# Patient Record
Sex: MALE | Race: WHITE | NOT HISPANIC OR LATINO | URBAN - METROPOLITAN AREA
[De-identification: names, ages, dates, MRNs, and addresses within clinical notes are randomized per-mention and may not be internally consistent; named-entity substitution may affect disease eponyms.]

---

## 2018-01-05 ENCOUNTER — EMERGENCY (EMERGENCY)
Facility: HOSPITAL | Age: 58
LOS: 1 days | Discharge: HOME | End: 2018-01-05
Attending: EMERGENCY MEDICINE | Admitting: EMERGENCY MEDICINE
Payer: COMMERCIAL

## 2018-01-05 VITALS
HEART RATE: 80 BPM | TEMPERATURE: 98 F | SYSTOLIC BLOOD PRESSURE: 110 MMHG | OXYGEN SATURATION: 98 % | HEIGHT: 68 IN | RESPIRATION RATE: 16 BRPM | DIASTOLIC BLOOD PRESSURE: 74 MMHG | WEIGHT: 177.91 LBS

## 2018-01-05 DIAGNOSIS — Z98.89 OTHER SPECIFIED POSTPROCEDURAL STATES: Chronic | ICD-10-CM

## 2018-01-05 DIAGNOSIS — Z95.1 PRESENCE OF AORTOCORONARY BYPASS GRAFT: Chronic | ICD-10-CM

## 2018-01-05 PROCEDURE — 99284 EMERGENCY DEPT VISIT MOD MDM: CPT

## 2018-01-05 PROCEDURE — 99285 EMERGENCY DEPT VISIT HI MDM: CPT

## 2018-01-05 NOTE — ED PROVIDER NOTE - OBJECTIVE STATEMENT
pt bib ems for syncope. pt was asleep at kitchen table when his son found him. pt reports felt fine this morning, went out shopping, when came home took one of his wife's xanax to "get high". pt denies fevers, chills, ha, d/n/v, cp, palp, abd pain, weakness, numbness, speech or visual changes, depression, anxiety, suicidal or homicidal ideation.  pmd/cards - garett

## 2018-01-05 NOTE — ED PROVIDER NOTE - PROGRESS NOTE DETAILS
Pt refusing EKG. labs, ct or any other testing or treatment in ER. Had an at length discussion with patient.  Patient wishes to leave at this time.  The patient understands that they are leaving against medical advice despite the risk of missing a potentially serious diagnosis which may lead to injury, disability and/or death.  I discussed with the patient which tests would need to be performed and what type of monitoring would be necessary for the patient as well.  I was unable to convince patient to stay for further workup.  The patient was given an opportunity to ask any questions as well.   The patient demonstrates understanding of all risks, is awake and alert and demonstrates competance to make medical decisions.  The patient understands that they may return at any time if desired. Discussed the importance of close, prompt medical follow-up.  Also present at bedside for discussions: wife

## 2018-01-05 NOTE — ED ADULT NURSE NOTE - OBJECTIVE STATEMENT
received pt in bed #4b Pt A&O Pt's son found pt passed out at table. Pt admits to taking xanax. Dr Burgos Pt refusing any treatment Dr Burgos exlained to pt risks of signing out patient education & verbalized undersatnding that signing AMA & leaving can result in death AMA signed & on chart Pt still requesting to leave

## 2018-01-05 NOTE — ED PROVIDER NOTE - PSH
H/O heart surgery  triple bypass  H/O heart surgery  cardiac stents  S/P CABG x 3  3v CABG 2010 at [facility]

## 2018-01-05 NOTE — ED ADULT NURSE NOTE - PMH
CAD (coronary artery disease) of artery bypass graft    Claudication of both lower extremities    CVA (cerebral vascular accident)  secondary to heart surgery  Essential hypertension    H/O heart surgery    High cholesterol    Hyperlipidemia    Hypertension

## 2018-01-05 NOTE — ED PROVIDER NOTE - CHPI ED SYMPTOMS NEG
no fever/no nausea/no chills/no back pain/no chest pain/no cough/no dizziness/no shortness of breath/no vomiting

## 2018-01-05 NOTE — ED PROVIDER NOTE - CARE PLAN
Principal Discharge DX:	Syncope Principal Discharge DX:	Syncope  Secondary Diagnosis:	Substance abuse

## 2018-01-05 NOTE — ED ADULT NURSE NOTE - PSH
H/O heart surgery  triple bypass  H/O heart surgery  cardiac stents  S/P CABG x 3  3v CABG 2010 at [facility]

## 2018-03-05 ENCOUNTER — APPOINTMENT (OUTPATIENT)
Dept: OTOLARYNGOLOGY | Facility: CLINIC | Age: 58
End: 2018-03-05
Payer: COMMERCIAL

## 2018-03-05 VITALS
BODY MASS INDEX: 29.37 KG/M2 | DIASTOLIC BLOOD PRESSURE: 100 MMHG | SYSTOLIC BLOOD PRESSURE: 137 MMHG | HEART RATE: 80 BPM | HEIGHT: 64 IN | WEIGHT: 172 LBS

## 2018-03-05 PROCEDURE — 31575 DIAGNOSTIC LARYNGOSCOPY: CPT

## 2018-03-05 PROCEDURE — 99205 OFFICE O/P NEW HI 60 MIN: CPT | Mod: 25

## 2018-03-23 ENCOUNTER — OUTPATIENT (OUTPATIENT)
Dept: OUTPATIENT SERVICES | Facility: HOSPITAL | Age: 58
LOS: 1 days | End: 2018-03-23
Payer: COMMERCIAL

## 2018-03-23 VITALS
WEIGHT: 186.07 LBS | HEIGHT: 67.5 IN | HEART RATE: 66 BPM | TEMPERATURE: 97 F | SYSTOLIC BLOOD PRESSURE: 140 MMHG | RESPIRATION RATE: 16 BRPM | DIASTOLIC BLOOD PRESSURE: 86 MMHG

## 2018-03-23 DIAGNOSIS — J38.3 OTHER DISEASES OF VOCAL CORDS: ICD-10-CM

## 2018-03-23 DIAGNOSIS — Z95.1 PRESENCE OF AORTOCORONARY BYPASS GRAFT: Chronic | ICD-10-CM

## 2018-03-23 DIAGNOSIS — I73.9 PERIPHERAL VASCULAR DISEASE, UNSPECIFIED: ICD-10-CM

## 2018-03-23 DIAGNOSIS — Z98.89 OTHER SPECIFIED POSTPROCEDURAL STATES: Chronic | ICD-10-CM

## 2018-03-23 DIAGNOSIS — R94.31 ABNORMAL ELECTROCARDIOGRAM [ECG] [EKG]: ICD-10-CM

## 2018-03-23 DIAGNOSIS — I10 ESSENTIAL (PRIMARY) HYPERTENSION: ICD-10-CM

## 2018-03-23 DIAGNOSIS — I25.810 ATHEROSCLEROSIS OF CORONARY ARTERY BYPASS GRAFT(S) WITHOUT ANGINA PECTORIS: ICD-10-CM

## 2018-03-23 DIAGNOSIS — Z98.62 PERIPHERAL VASCULAR ANGIOPLASTY STATUS: Chronic | ICD-10-CM

## 2018-03-23 LAB
BUN SERPL-MCNC: 24 MG/DL — HIGH (ref 7–23)
CALCIUM SERPL-MCNC: 8.9 MG/DL — SIGNIFICANT CHANGE UP (ref 8.4–10.5)
CHLORIDE SERPL-SCNC: 105 MMOL/L — SIGNIFICANT CHANGE UP (ref 98–107)
CO2 SERPL-SCNC: 24 MMOL/L — SIGNIFICANT CHANGE UP (ref 22–31)
CREAT SERPL-MCNC: 0.8 MG/DL — SIGNIFICANT CHANGE UP (ref 0.5–1.3)
GLUCOSE SERPL-MCNC: 83 MG/DL — SIGNIFICANT CHANGE UP (ref 70–99)
HCT VFR BLD CALC: 44.5 % — SIGNIFICANT CHANGE UP (ref 39–50)
HGB BLD-MCNC: 15.2 G/DL — SIGNIFICANT CHANGE UP (ref 13–17)
MCHC RBC-ENTMCNC: 29.9 PG — SIGNIFICANT CHANGE UP (ref 27–34)
MCHC RBC-ENTMCNC: 34.2 % — SIGNIFICANT CHANGE UP (ref 32–36)
MCV RBC AUTO: 87.4 FL — SIGNIFICANT CHANGE UP (ref 80–100)
NRBC # FLD: 0 — SIGNIFICANT CHANGE UP
PLATELET # BLD AUTO: 313 K/UL — SIGNIFICANT CHANGE UP (ref 150–400)
PMV BLD: 10.2 FL — SIGNIFICANT CHANGE UP (ref 7–13)
POTASSIUM SERPL-MCNC: 4.2 MMOL/L — SIGNIFICANT CHANGE UP (ref 3.5–5.3)
POTASSIUM SERPL-SCNC: 4.2 MMOL/L — SIGNIFICANT CHANGE UP (ref 3.5–5.3)
RBC # BLD: 5.09 M/UL — SIGNIFICANT CHANGE UP (ref 4.2–5.8)
RBC # FLD: 13 % — SIGNIFICANT CHANGE UP (ref 10.3–14.5)
SODIUM SERPL-SCNC: 144 MMOL/L — SIGNIFICANT CHANGE UP (ref 135–145)
WBC # BLD: 8.6 K/UL — SIGNIFICANT CHANGE UP (ref 3.8–10.5)
WBC # FLD AUTO: 8.6 K/UL — SIGNIFICANT CHANGE UP (ref 3.8–10.5)

## 2018-03-23 PROCEDURE — 93010 ELECTROCARDIOGRAM REPORT: CPT

## 2018-03-23 NOTE — H&P PST ADULT - PROBLEM SELECTOR PLAN 4
Pt was instructed by his cardiologist to remain on Ecotrin 81mg due to the presence of peripheral stents

## 2018-03-23 NOTE — H&P PST ADULT - PROBLEM SELECTOR PLAN 3
Pt was instructed by his Cardiologist to hold Plavix for 7 days preop. Last dose was 3/21/18  He was seen by his Cardiologist for preop evaluation   Cardiac evaluation requested with recent ECHO and Stress test reports

## 2018-03-23 NOTE — H&P PST ADULT - PROBLEM SELECTOR PLAN 1
Scheduled for Suspension Microlaryngoscopy with Biopsy Esophagoscopy on 3/28/2018.   Preop instructions given, pt verbalized understanding   GI prophylaxis provided

## 2018-03-23 NOTE — H&P PST ADULT - HISTORY OF PRESENT ILLNESS
57 y/o male with PMH of CAD s/p CABG currently on Plavix, HTN, HLD and PAD s/p peripheral artery stenting presents to PST for preoperative evaluation with dx of disease of vocal cords. Pt presented to his PCP c/o voice hoarseness in Dec2017.  Referred to ENT and s/p CT scan of neck and chest which noted small mass of right vocal cord. Pt s/p laryngoscopy with Dr. Sanabria 3 weeks ago which noted right vocal cord lesion requiring  further evaluation. Scheduled for Suspension Microlaryngoscopy with Biopsy Esophagoscopy on 3/28/2018. 59 y/o male with PMH of CAD s/p CABG x3 on Plavix, HTN, HLD and PAD s/p peripheral artery stenting presents to PST for preoperative evaluation with dx of disease of vocal cords. Pt presented to his PCP c/o voice hoarseness in Dec2017.  Referred to ENT and s/p CT scan of neck and chest which noted small mass of right vocal cord.  Pt s/p laryngoscopy with Dr. Sanabria 3 weeks ago which noted right vocal cord lesion requiring further evaluation. Scheduled for Suspension Microlaryngoscopy with Biopsy Esophagoscopy on 3/28/2018.

## 2018-03-23 NOTE — H&P PST ADULT - NEUROLOGICAL COMMENTS
Pt reports after CABG in 2010 he had a CVA during surgery. Pt reports he lost peripheral vision which had not fully returned. Seen by Neuro. Last visit 2011 Pt reports after CABG in 2010 he had a CVA during surgery. Pt reports he lost peripheral vision of his left eye which had not fully returned. Seen by Neuro. Last visit 2011

## 2018-03-23 NOTE — H&P PST ADULT - NEGATIVE NEUROLOGICAL SYMPTOMS
no generalized seizures/no transient paralysis/no weakness/no hemiparesis/no facial palsy/no paresthesias/no focal seizures/no difficulty walking/no loss of consciousness/no loss of sensation/no headache/no syncope/no tremors/no vertigo

## 2018-03-23 NOTE — H&P PST ADULT - NEGATIVE CARDIOVASCULAR SYMPTOMS
no chest pain/no dyspnea on exertion/no palpitations/no paroxysmal nocturnal dyspnea/no peripheral edema

## 2018-03-23 NOTE — H&P PST ADULT - NEGATIVE ENMT SYMPTOMS
no sinus symptoms/no nasal congestion/no tinnitus/no nose bleeds/no ear pain/no gum bleeding/no throat pain/no dysphagia/no hearing difficulty

## 2018-03-23 NOTE — H&P PST ADULT - PSH
S/P CABG x 3  3v CABG 2010 at [facility]  S/P peripheral artery angioplasty  11/2015 5 stents to right leg

## 2018-03-23 NOTE — H&P PST ADULT - PMH
CAD (coronary artery disease) of artery bypass graft    Claudication of both lower extremities    CVA (cerebral vascular accident)  secondary to heart surgery  Essential hypertension    H/O heart surgery    High cholesterol    Hyperlipidemia    Hypertension    PAD (peripheral artery disease) CAD (coronary artery disease) of artery bypass graft    Claudication of both lower extremities    CVA (cerebral vascular accident)  secondary to heart surgery  Essential hypertension    H/O heart surgery    High cholesterol    Hyperlipidemia    Hypertension    PAD (peripheral artery disease)    Vocal cord disease

## 2018-03-23 NOTE — H&P PST ADULT - NSANTHOSAYNRD_GEN_A_CORE
No. CHRISTOPHER screening performed.  STOP BANG Legend: 0-2 = LOW Risk; 3-4 = INTERMEDIATE Risk; 5-8 = HIGH Risk

## 2018-03-27 ENCOUNTER — TRANSCRIPTION ENCOUNTER (OUTPATIENT)
Age: 58
End: 2018-03-27

## 2018-03-27 NOTE — ASU PATIENT PROFILE, ADULT - PMH
CAD (coronary artery disease) of artery bypass graft    Claudication of both lower extremities    CVA (cerebral vascular accident)  secondary to heart surgery  Essential hypertension    H/O heart surgery    High cholesterol    Hyperlipidemia    Hypertension    PAD (peripheral artery disease)    Vocal cord disease

## 2018-03-27 NOTE — ASU PATIENT PROFILE, ADULT - PSH
S/P CABG x 3  3v CABG 2010 at [facility]  S/P peripheral artery angioplasty  11/2015 5 stents to right leg

## 2018-03-28 ENCOUNTER — RESULT REVIEW (OUTPATIENT)
Age: 58
End: 2018-03-28

## 2018-03-28 ENCOUNTER — APPOINTMENT (OUTPATIENT)
Dept: OTOLARYNGOLOGY | Facility: HOSPITAL | Age: 58
End: 2018-03-28

## 2018-03-28 ENCOUNTER — OUTPATIENT (OUTPATIENT)
Dept: OUTPATIENT SERVICES | Facility: HOSPITAL | Age: 58
LOS: 1 days | Discharge: HOME | End: 2018-03-28
Payer: COMMERCIAL

## 2018-03-28 VITALS
OXYGEN SATURATION: 95 % | RESPIRATION RATE: 14 BRPM | DIASTOLIC BLOOD PRESSURE: 62 MMHG | HEART RATE: 62 BPM | SYSTOLIC BLOOD PRESSURE: 124 MMHG

## 2018-03-28 VITALS
WEIGHT: 186.07 LBS | DIASTOLIC BLOOD PRESSURE: 83 MMHG | HEIGHT: 67.5 IN | RESPIRATION RATE: 16 BRPM | TEMPERATURE: 98 F | HEART RATE: 68 BPM | SYSTOLIC BLOOD PRESSURE: 121 MMHG | OXYGEN SATURATION: 97 %

## 2018-03-28 DIAGNOSIS — Z98.62 PERIPHERAL VASCULAR ANGIOPLASTY STATUS: Chronic | ICD-10-CM

## 2018-03-28 DIAGNOSIS — J38.3 OTHER DISEASES OF VOCAL CORDS: ICD-10-CM

## 2018-03-28 DIAGNOSIS — Z95.1 PRESENCE OF AORTOCORONARY BYPASS GRAFT: Chronic | ICD-10-CM

## 2018-03-28 PROCEDURE — 88305 TISSUE EXAM BY PATHOLOGIST: CPT | Mod: 26

## 2018-03-28 PROCEDURE — 88331 PATH CONSLTJ SURG 1 BLK 1SPC: CPT | Mod: 26

## 2018-03-28 PROCEDURE — 31541 LARYNSCOP W/TUMR EXC + SCOPE: CPT

## 2018-03-28 PROCEDURE — 43191 ESOPHAGOSCOPY RIGID TRNSO DX: CPT

## 2018-03-28 RX ORDER — SODIUM CHLORIDE 9 MG/ML
1000 INJECTION, SOLUTION INTRAVENOUS
Qty: 0 | Refills: 0 | Status: DISCONTINUED | OUTPATIENT
Start: 2018-03-28 | End: 2018-04-12

## 2018-03-28 RX ORDER — PANTOPRAZOLE SODIUM 20 MG/1
1 TABLET, DELAYED RELEASE ORAL
Qty: 30 | Refills: 2 | OUTPATIENT
Start: 2018-03-28

## 2018-03-28 NOTE — ASU DISCHARGE PLAN (ADULT/PEDIATRIC). - INSTRUCTIONS
Advance as tolerated Advance as tolerated. Cool and warm liquids that are not irritating to the throat should be given for the first day or two. Avoid hot liquids. Avoid citrus juices and milk. Advance at your own pace starting with soft foods and advancing to a regular diet. Avoid rough and scratchy foods. .

## 2018-03-28 NOTE — ASU DISCHARGE PLAN (ADULT/PEDIATRIC). - ITEMS TO FOLLOWUP WITH YOUR PHYSICIAN'S
1. Use tylenol or motrin as needed for pain control.  2. Take pantoprazole as prescribed.  3. Voice rest (avoid voice use, no whispering or shouting) for 5 days

## 2018-03-28 NOTE — ASU DISCHARGE PLAN (ADULT/PEDIATRIC). - MEDICATION SUMMARY - MEDICATIONS TO TAKE
I will START or STAY ON the medications listed below when I get home from the hospital:    Ecotrin Adult Low Strength 81 mg oral delayed release tablet  -- 1 tab(s) by mouth once a day AM  -- Indication: For home medication    ramipril 5 mg oral tablet  -- 1 tab(s) by mouth once a day AM  -- Indication: For home medication    atorvastatin 80 mg oral tablet  -- 1 tab(s) by mouth once a day PM  -- Indication: For home medication    Plavix 75 mg oral tablet  -- 1 tab(s) by mouth once a day LD 3/21/18  -- Indication: For home medication    amLODIPine 5 mg oral tablet  -- 1 tab(s) by mouth once a day AM  -- Indication: For home medication    pantoprazole 40 mg oral delayed release tablet  -- 1 tab(s) by mouth once a day   -- It is very important that you take or use this exactly as directed.  Do not skip doses or discontinue unless directed by your doctor.  Obtain medical advice before taking any non-prescription drugs as some may affect the action of this medication.  Swallow whole.  Do not crush.    -- Indication: For acid inhibitor

## 2018-03-28 NOTE — ASU DISCHARGE PLAN (ADULT/PEDIATRIC). - NOTIFY
Swelling that continues/Bleeding that does not stop Fever greater than 101/Persistent Nausea and Vomiting/Bleeding that does not stop/Swelling that continues

## 2018-03-28 NOTE — ASU DISCHARGE PLAN (ADULT/PEDIATRIC). - NURSING INSTRUCTIONS
Do Not take any more than 3000mg in a 24hr period of Tylenol. Please do not take any until until after 7:40pm

## 2018-04-06 ENCOUNTER — APPOINTMENT (OUTPATIENT)
Dept: OTOLARYNGOLOGY | Facility: CLINIC | Age: 58
End: 2018-04-06
Payer: COMMERCIAL

## 2018-04-06 VITALS
BODY MASS INDEX: 27.74 KG/M2 | WEIGHT: 183 LBS | HEART RATE: 85 BPM | OXYGEN SATURATION: 95 % | DIASTOLIC BLOOD PRESSURE: 70 MMHG | SYSTOLIC BLOOD PRESSURE: 120 MMHG | HEIGHT: 68 IN

## 2018-04-06 DIAGNOSIS — J38.3 OTHER DISEASES OF VOCAL CORDS: ICD-10-CM

## 2018-04-06 PROCEDURE — 99214 OFFICE O/P EST MOD 30 MIN: CPT | Mod: 25

## 2018-04-06 PROCEDURE — 31575 DIAGNOSTIC LARYNGOSCOPY: CPT

## 2018-04-06 RX ORDER — OXYCODONE AND ACETAMINOPHEN 5; 325 MG/1; MG/1
5-325 TABLET ORAL
Qty: 28 | Refills: 0 | Status: DISCONTINUED | COMMUNITY
Start: 2018-03-29 | End: 2018-04-06

## 2018-04-06 RX ORDER — ATORVASTATIN CALCIUM 80 MG/1
80 TABLET, FILM COATED ORAL
Qty: 30 | Refills: 0 | Status: ACTIVE | COMMUNITY
Start: 2017-08-21

## 2018-04-30 ENCOUNTER — OUTPATIENT (OUTPATIENT)
Dept: OUTPATIENT SERVICES | Facility: HOSPITAL | Age: 58
LOS: 1 days | End: 2018-04-30
Payer: COMMERCIAL

## 2018-04-30 VITALS
TEMPERATURE: 97 F | HEIGHT: 67 IN | DIASTOLIC BLOOD PRESSURE: 88 MMHG | WEIGHT: 184.09 LBS | HEART RATE: 76 BPM | RESPIRATION RATE: 16 BRPM | SYSTOLIC BLOOD PRESSURE: 140 MMHG

## 2018-04-30 DIAGNOSIS — Z98.62 PERIPHERAL VASCULAR ANGIOPLASTY STATUS: Chronic | ICD-10-CM

## 2018-04-30 DIAGNOSIS — D02.0 CARCINOMA IN SITU OF LARYNX: ICD-10-CM

## 2018-04-30 DIAGNOSIS — Z01.818 ENCOUNTER FOR OTHER PREPROCEDURAL EXAMINATION: ICD-10-CM

## 2018-04-30 DIAGNOSIS — Z95.1 PRESENCE OF AORTOCORONARY BYPASS GRAFT: Chronic | ICD-10-CM

## 2018-04-30 LAB
BUN SERPL-MCNC: 17 MG/DL — SIGNIFICANT CHANGE UP (ref 7–23)
CALCIUM SERPL-MCNC: 9 MG/DL — SIGNIFICANT CHANGE UP (ref 8.4–10.5)
CHLORIDE SERPL-SCNC: 100 MMOL/L — SIGNIFICANT CHANGE UP (ref 98–107)
CO2 SERPL-SCNC: 25 MMOL/L — SIGNIFICANT CHANGE UP (ref 22–31)
CREAT SERPL-MCNC: 0.71 MG/DL — SIGNIFICANT CHANGE UP (ref 0.5–1.3)
GLUCOSE SERPL-MCNC: 85 MG/DL — SIGNIFICANT CHANGE UP (ref 70–99)
HCT VFR BLD CALC: 45.3 % — SIGNIFICANT CHANGE UP (ref 39–50)
HGB BLD-MCNC: 15.1 G/DL — SIGNIFICANT CHANGE UP (ref 13–17)
MCHC RBC-ENTMCNC: 29.2 PG — SIGNIFICANT CHANGE UP (ref 27–34)
MCHC RBC-ENTMCNC: 33.3 % — SIGNIFICANT CHANGE UP (ref 32–36)
MCV RBC AUTO: 87.6 FL — SIGNIFICANT CHANGE UP (ref 80–100)
NRBC # FLD: 0 — SIGNIFICANT CHANGE UP
PLATELET # BLD AUTO: 322 K/UL — SIGNIFICANT CHANGE UP (ref 150–400)
PMV BLD: 10.7 FL — SIGNIFICANT CHANGE UP (ref 7–13)
POTASSIUM SERPL-MCNC: 4.2 MMOL/L — SIGNIFICANT CHANGE UP (ref 3.5–5.3)
POTASSIUM SERPL-SCNC: 4.2 MMOL/L — SIGNIFICANT CHANGE UP (ref 3.5–5.3)
RBC # BLD: 5.17 M/UL — SIGNIFICANT CHANGE UP (ref 4.2–5.8)
RBC # FLD: 13.2 % — SIGNIFICANT CHANGE UP (ref 10.3–14.5)
SODIUM SERPL-SCNC: 139 MMOL/L — SIGNIFICANT CHANGE UP (ref 135–145)
WBC # BLD: 9.74 K/UL — SIGNIFICANT CHANGE UP (ref 3.8–10.5)
WBC # FLD AUTO: 9.74 K/UL — SIGNIFICANT CHANGE UP (ref 3.8–10.5)

## 2018-04-30 PROCEDURE — 93010 ELECTROCARDIOGRAM REPORT: CPT

## 2018-04-30 RX ORDER — CLOPIDOGREL BISULFATE 75 MG/1
1 TABLET, FILM COATED ORAL
Qty: 0 | Refills: 0 | COMMUNITY

## 2018-04-30 RX ORDER — SODIUM CHLORIDE 9 MG/ML
1000 INJECTION, SOLUTION INTRAVENOUS
Qty: 0 | Refills: 0 | Status: DISCONTINUED | OUTPATIENT
Start: 2018-05-02 | End: 2018-05-17

## 2018-04-30 NOTE — H&P PST ADULT - PMH
CAD (coronary artery disease) of artery bypass graft    Claudication of both lower extremities    CVA (cerebral vascular accident)  secondary to heart surgery  Essential hypertension    H/O heart surgery    High cholesterol    Hyperlipidemia    Hypertension    PAD (peripheral artery disease)    Vocal cord disease CAD (coronary artery disease) of artery bypass graft    Carcinoma in situ  of larynx  Claudication of both lower extremities    CVA (cerebral vascular accident)  secondary to heart surgery  Essential hypertension    H/O heart surgery    High cholesterol    Hyperlipidemia    Hypertension    PAD (peripheral artery disease)    Vocal cord disease

## 2018-04-30 NOTE — H&P PST ADULT - PROBLEM SELECTOR PLAN 3
Pt was instructed by his Cardiologist to hold Plavix for 7 days preop. Last dose was 3/21/18  He was seen by his Cardiologist for preop evaluation   Cardiac evaluation requested with recent ECHO and Stress test reports Pt was instructed by his Cardiologist to hold plavix, last dose 4/25/2018  Cardiac eval, recent ECHO and Stress test reports pending Pt was instructed by his cardiologist to remain on Ecotrin 81mg due to the presence of peripheral stents

## 2018-04-30 NOTE — H&P PST ADULT - NEGATIVE NEUROLOGICAL SYMPTOMS
no difficulty walking/no weakness/no generalized seizures/no focal seizures/no syncope/no tremors/no headache/no facial palsy/no paresthesias/no transient paralysis/no vertigo/no loss of sensation/no loss of consciousness/no hemiparesis

## 2018-04-30 NOTE — H&P PST ADULT - NEUROLOGICAL COMMENTS
Pt reports after CABG in 2010 he had a CVA during surgery. Pt reports he lost peripheral vision of his left eye which had not fully returned. Seen by Neuro. Last visit 2011

## 2018-04-30 NOTE — H&P PST ADULT - NEGATIVE CARDIOVASCULAR SYMPTOMS
no dyspnea on exertion/no paroxysmal nocturnal dyspnea/no palpitations/no chest pain/no peripheral edema

## 2018-04-30 NOTE — H&P PST ADULT - PSH
S/P CABG x 3  3v CABG 2010 at [facility]  S/P peripheral artery angioplasty  11/2015 5 stents to right leg

## 2018-04-30 NOTE — H&P PST ADULT - PROBLEM SELECTOR PLAN 6
Scheduled for suspension microlaryngoscope with excision of vocal cord lesion on 5/2/2018.    Preop instructions given, pt verbalized understanding   GI prophylaxis provided  Preop instructions given, pt verbalized understanding   GI prophylaxis provided

## 2018-04-30 NOTE — H&P PST ADULT - PROBLEM SELECTOR PLAN 4
Pt was instructed by his cardiologist to remain on Ecotrin 81mg due to the presence of peripheral stents Scheduled for suspension microlaryngoscope with excision of vocal cord lesion on 5/2/2018.    Preop instructions given, pt verbalized understanding   GI prophylaxis provided  CHRISTOPHER precaution recommended  OR booking notified via fax

## 2018-04-30 NOTE — H&P PST ADULT - PROBLEM SELECTOR PLAN 1
Scheduled for Suspension Microlaryngoscopy with Biopsy Esophagoscopy on 3/28/2018.   Preop instructions given, pt verbalized understanding   GI prophylaxis provided Pt to take Ramipril and Amlodipine AM of surgery with a sip of water

## 2018-04-30 NOTE — H&P PST ADULT - NEGATIVE ENMT SYMPTOMS
no ear pain/no hearing difficulty/no nose bleeds/no dysphagia/no throat pain/no tinnitus/no nasal congestion/no sinus symptoms/no gum bleeding

## 2018-04-30 NOTE — H&P PST ADULT - HISTORY OF PRESENT ILLNESS
57 y/o male with PMH of CAD s/p CABG x3 on Plavix, HTN, HLD and PAD s/p peripheral artery stenting presents to PST for preoperative evaluation with dx of disease of vocal cords. Pt presented to his PCP c/o voice hoarseness in Dec2017.  Referred to ENT and s/p CT scan of neck and chest which noted small mass of right vocal cord.  Pt s/p laryngoscopy with Dr. Sanabria 3 weeks ago which noted right vocal cord lesion requiring further evaluation. Scheduled for Suspension Microlaryngoscopy with Biopsy Esophagoscopy on 3/28/2018. 57 y/o male with PMH of CAD s/p CABG x3 on Plavix, HTN, HLD and PAD s/p peripheral artery stenting presents to PST for preoperative evaluation with dx of disease of vocal cords. Pt presented to his PCP c/o voice hoarseness in Dec2017.  Referred to ENT and s/p CT scan of neck and chest which noted small mass of right vocal cord.  Pt is s/p Suspension Microlaryngoscopy with Biopsy Esophagoscopy on 3/28/2018.  Per pt hoarseness much improved, now for follow up.  Scheduled for suspension microlaryngoscope with excision of vocal cord lesion on 5/2/2018.

## 2018-04-30 NOTE — H&P PST ADULT - PROBLEM SELECTOR PLAN 2
Pt to take Ramipril and Amlodipine AM of surgery with a sip of water Pt was instructed by his Cardiologist to hold plavix, last dose 4/25/2018  Cardiac eval, recent ECHO and Stress test reports pending

## 2018-05-01 ENCOUNTER — TRANSCRIPTION ENCOUNTER (OUTPATIENT)
Age: 58
End: 2018-05-01

## 2018-05-02 ENCOUNTER — RESULT REVIEW (OUTPATIENT)
Age: 58
End: 2018-05-02

## 2018-05-02 ENCOUNTER — APPOINTMENT (OUTPATIENT)
Dept: OTOLARYNGOLOGY | Facility: HOSPITAL | Age: 58
End: 2018-05-02

## 2018-05-02 ENCOUNTER — OUTPATIENT (OUTPATIENT)
Dept: OUTPATIENT SERVICES | Facility: HOSPITAL | Age: 58
LOS: 1 days | Discharge: HOME | End: 2018-05-02
Payer: COMMERCIAL

## 2018-05-02 VITALS
SYSTOLIC BLOOD PRESSURE: 126 MMHG | RESPIRATION RATE: 16 BRPM | TEMPERATURE: 98 F | DIASTOLIC BLOOD PRESSURE: 76 MMHG | HEIGHT: 67 IN | HEART RATE: 78 BPM | OXYGEN SATURATION: 97 % | WEIGHT: 184.09 LBS

## 2018-05-02 VITALS
DIASTOLIC BLOOD PRESSURE: 83 MMHG | RESPIRATION RATE: 15 BRPM | TEMPERATURE: 98 F | SYSTOLIC BLOOD PRESSURE: 127 MMHG | OXYGEN SATURATION: 98 %

## 2018-05-02 DIAGNOSIS — Z98.62 PERIPHERAL VASCULAR ANGIOPLASTY STATUS: Chronic | ICD-10-CM

## 2018-05-02 DIAGNOSIS — D02.0 CARCINOMA IN SITU OF LARYNX: ICD-10-CM

## 2018-05-02 DIAGNOSIS — Z95.1 PRESENCE OF AORTOCORONARY BYPASS GRAFT: Chronic | ICD-10-CM

## 2018-05-02 PROCEDURE — 31541 LARYNSCOP W/TUMR EXC + SCOPE: CPT | Mod: GC

## 2018-05-02 PROCEDURE — 88305 TISSUE EXAM BY PATHOLOGIST: CPT | Mod: 26

## 2018-05-02 PROCEDURE — 43191 ESOPHAGOSCOPY RIGID TRNSO DX: CPT | Mod: GC

## 2018-05-02 RX ORDER — OXYCODONE AND ACETAMINOPHEN 5; 325 MG/1; MG/1
1 TABLET ORAL EVERY 6 HOURS
Qty: 0 | Refills: 0 | Status: DISCONTINUED | OUTPATIENT
Start: 2018-05-02 | End: 2018-05-02

## 2018-05-02 RX ORDER — ATORVASTATIN CALCIUM 80 MG/1
1 TABLET, FILM COATED ORAL
Qty: 0 | Refills: 0 | COMMUNITY

## 2018-05-02 RX ORDER — CLOPIDOGREL BISULFATE 75 MG/1
1 TABLET, FILM COATED ORAL
Qty: 0 | Refills: 0 | COMMUNITY

## 2018-05-02 RX ORDER — ACETAMINOPHEN 500 MG
2 TABLET ORAL
Qty: 0 | Refills: 0 | COMMUNITY
Start: 2018-05-02

## 2018-05-02 RX ORDER — ASPIRIN/CALCIUM CARB/MAGNESIUM 324 MG
1 TABLET ORAL
Qty: 0 | Refills: 0 | COMMUNITY

## 2018-05-02 RX ORDER — ACETAMINOPHEN 500 MG
650 TABLET ORAL EVERY 6 HOURS
Qty: 0 | Refills: 0 | Status: DISCONTINUED | OUTPATIENT
Start: 2018-05-02 | End: 2018-05-17

## 2018-05-02 RX ORDER — SODIUM CHLORIDE 9 MG/ML
1000 INJECTION, SOLUTION INTRAVENOUS
Qty: 0 | Refills: 0 | Status: DISCONTINUED | OUTPATIENT
Start: 2018-05-02 | End: 2018-05-17

## 2018-05-02 RX ORDER — AMLODIPINE BESYLATE 2.5 MG/1
1 TABLET ORAL
Qty: 0 | Refills: 0 | COMMUNITY

## 2018-05-02 RX ORDER — RAMIPRIL 5 MG
1 CAPSULE ORAL
Qty: 0 | Refills: 0 | COMMUNITY

## 2018-05-02 RX ADMIN — SODIUM CHLORIDE 30 MILLILITER(S): 9 INJECTION, SOLUTION INTRAVENOUS at 13:19

## 2018-05-02 NOTE — ASU PATIENT PROFILE, ADULT - PMH
CAD (coronary artery disease) of artery bypass graft    Carcinoma in situ  of larynx  Claudication of both lower extremities    CVA (cerebral vascular accident)  secondary to heart surgery  Essential hypertension    H/O heart surgery    High cholesterol    Hyperlipidemia    Hypertension    PAD (peripheral artery disease)    Vocal cord disease

## 2018-05-02 NOTE — ASU PREOP CHECKLIST - 5.
report from Vijaya /MODESTO at 13: 05 report from Vijaya /MODESTO at 13: 05/ give report to  Luci at 16:24

## 2018-05-02 NOTE — ASU PATIENT PROFILE, ADULT - PSH
S/P CABG x 3  3v CABG 2010 at [facility]  S/P peripheral artery angioplasty  11/2015 5 stents to right leg

## 2018-05-02 NOTE — ASU DISCHARGE PLAN (ADULT/PEDIATRIC). - MEDICATION SUMMARY - MEDICATIONS TO TAKE
I will START or STAY ON the medications listed below when I get home from the hospital:    Ecotrin Adult Low Strength 81 mg oral delayed release tablet  -- 1 tab(s) by mouth once a day AM  -- Indication: For home med    acetaminophen 325 mg oral tablet  -- 2 tab(s) by mouth every 6 hours, As needed, Mild Pain (1 - 3)  -- Indication: For over the counter pain medication as needed    ramipril 5 mg oral tablet  -- 1 tab(s) by mouth once a day AM  -- Indication: For home med    atorvastatin 80 mg oral tablet  -- 1 tab(s) by mouth once a day PM  -- Indication: For home med    Plavix 75 mg oral tablet  -- 1 tab(s) by mouth once a day Last taken 4/25/18  -- Indication: For home med    amLODIPine 5 mg oral tablet  -- 1 tab(s) by mouth once a day AM  -- Indication: For home med

## 2018-05-02 NOTE — ASU DISCHARGE PLAN (ADULT/PEDIATRIC). - SPECIAL INSTRUCTIONS
voice rest x 3 days voice rest x 3 days  Attemp to speak in a normal speaking voice. No shouting or whispering

## 2018-05-02 NOTE — ASU DISCHARGE PLAN (ADULT/PEDIATRIC). - COMMENTS
call  PACU  [phone] ( open  24 hour  and weekend ) , [phone] ( open 6AM to 11 PM  closed weekend)

## 2018-05-02 NOTE — ASU PREOP CHECKLIST - 3.
po#[phone] pt. granted permission to leave message /and or speak with whoever answers the phone.

## 2018-05-22 ENCOUNTER — APPOINTMENT (OUTPATIENT)
Dept: OTOLARYNGOLOGY | Facility: CLINIC | Age: 58
End: 2018-05-22
Payer: COMMERCIAL

## 2018-05-22 VITALS — DIASTOLIC BLOOD PRESSURE: 72 MMHG | SYSTOLIC BLOOD PRESSURE: 107 MMHG | HEART RATE: 81 BPM

## 2018-05-22 PROCEDURE — 31575 DIAGNOSTIC LARYNGOSCOPY: CPT

## 2018-05-22 PROCEDURE — 99214 OFFICE O/P EST MOD 30 MIN: CPT | Mod: 25

## 2018-07-17 ENCOUNTER — EMERGENCY (EMERGENCY)
Facility: HOSPITAL | Age: 58
LOS: 1 days | Discharge: HOME | End: 2018-07-17
Attending: EMERGENCY MEDICINE
Payer: COMMERCIAL

## 2018-07-17 VITALS
RESPIRATION RATE: 19 BRPM | TEMPERATURE: 99 F | SYSTOLIC BLOOD PRESSURE: 119 MMHG | HEART RATE: 97 BPM | WEIGHT: 196.21 LBS | DIASTOLIC BLOOD PRESSURE: 71 MMHG | OXYGEN SATURATION: 96 % | HEIGHT: 68 IN

## 2018-07-17 VITALS
SYSTOLIC BLOOD PRESSURE: 139 MMHG | DIASTOLIC BLOOD PRESSURE: 91 MMHG | OXYGEN SATURATION: 95 % | HEART RATE: 83 BPM | TEMPERATURE: 98 F | RESPIRATION RATE: 15 BRPM

## 2018-07-17 DIAGNOSIS — Z98.62 PERIPHERAL VASCULAR ANGIOPLASTY STATUS: Chronic | ICD-10-CM

## 2018-07-17 DIAGNOSIS — Z95.1 PRESENCE OF AORTOCORONARY BYPASS GRAFT: Chronic | ICD-10-CM

## 2018-07-17 LAB
ALBUMIN SERPL ELPH-MCNC: 3.6 G/DL — SIGNIFICANT CHANGE UP (ref 3.3–5)
ALP SERPL-CCNC: 62 U/L — SIGNIFICANT CHANGE UP (ref 40–120)
ALT FLD-CCNC: 35 U/L — SIGNIFICANT CHANGE UP (ref 12–78)
ANION GAP SERPL CALC-SCNC: 10 MMOL/L — SIGNIFICANT CHANGE UP (ref 5–17)
AST SERPL-CCNC: 27 U/L — SIGNIFICANT CHANGE UP (ref 15–37)
BASOPHILS # BLD AUTO: 0.07 K/UL — SIGNIFICANT CHANGE UP (ref 0–0.2)
BASOPHILS NFR BLD AUTO: 0.7 % — SIGNIFICANT CHANGE UP (ref 0–2)
BILIRUB SERPL-MCNC: 0.8 MG/DL — SIGNIFICANT CHANGE UP (ref 0.2–1.2)
BUN SERPL-MCNC: 20 MG/DL — SIGNIFICANT CHANGE UP (ref 7–23)
CALCIUM SERPL-MCNC: 8.3 MG/DL — LOW (ref 8.5–10.1)
CHLORIDE SERPL-SCNC: 108 MMOL/L — SIGNIFICANT CHANGE UP (ref 96–108)
CO2 SERPL-SCNC: 24 MMOL/L — SIGNIFICANT CHANGE UP (ref 22–31)
CREAT SERPL-MCNC: 0.9 MG/DL — SIGNIFICANT CHANGE UP (ref 0.5–1.3)
EOSINOPHIL # BLD AUTO: 0.2 K/UL — SIGNIFICANT CHANGE UP (ref 0–0.5)
EOSINOPHIL NFR BLD AUTO: 2 % — SIGNIFICANT CHANGE UP (ref 0–6)
GLUCOSE SERPL-MCNC: 83 MG/DL — SIGNIFICANT CHANGE UP (ref 70–99)
HCT VFR BLD CALC: 39.7 % — SIGNIFICANT CHANGE UP (ref 39–50)
HGB BLD-MCNC: 14.3 G/DL — SIGNIFICANT CHANGE UP (ref 13–17)
IMM GRANULOCYTES NFR BLD AUTO: 0.5 % — SIGNIFICANT CHANGE UP (ref 0–1.5)
LACTATE SERPL-SCNC: 1.7 MMOL/L — SIGNIFICANT CHANGE UP (ref 0.7–2)
LYMPHOCYTES # BLD AUTO: 2.42 K/UL — SIGNIFICANT CHANGE UP (ref 1–3.3)
LYMPHOCYTES # BLD AUTO: 23.8 % — SIGNIFICANT CHANGE UP (ref 13–44)
MCHC RBC-ENTMCNC: 30.6 PG — SIGNIFICANT CHANGE UP (ref 27–34)
MCHC RBC-ENTMCNC: 36 GM/DL — SIGNIFICANT CHANGE UP (ref 32–36)
MCV RBC AUTO: 85 FL — SIGNIFICANT CHANGE UP (ref 80–100)
MONOCYTES # BLD AUTO: 0.83 K/UL — SIGNIFICANT CHANGE UP (ref 0–0.9)
MONOCYTES NFR BLD AUTO: 8.2 % — SIGNIFICANT CHANGE UP (ref 2–14)
NEUTROPHILS # BLD AUTO: 6.61 K/UL — SIGNIFICANT CHANGE UP (ref 1.8–7.4)
NEUTROPHILS NFR BLD AUTO: 64.8 % — SIGNIFICANT CHANGE UP (ref 43–77)
NRBC # BLD: 0 /100 WBCS — SIGNIFICANT CHANGE UP (ref 0–0)
PLATELET # BLD AUTO: 311 K/UL — SIGNIFICANT CHANGE UP (ref 150–400)
POTASSIUM SERPL-MCNC: 4.4 MMOL/L — SIGNIFICANT CHANGE UP (ref 3.5–5.3)
POTASSIUM SERPL-SCNC: 4.4 MMOL/L — SIGNIFICANT CHANGE UP (ref 3.5–5.3)
PROT SERPL-MCNC: 6.8 G/DL — SIGNIFICANT CHANGE UP (ref 6–8.3)
RBC # BLD: 4.67 M/UL — SIGNIFICANT CHANGE UP (ref 4.2–5.8)
RBC # FLD: 12.8 % — SIGNIFICANT CHANGE UP (ref 10.3–14.5)
SODIUM SERPL-SCNC: 142 MMOL/L — SIGNIFICANT CHANGE UP (ref 135–145)
WBC # BLD: 10.18 K/UL — SIGNIFICANT CHANGE UP (ref 3.8–10.5)
WBC # FLD AUTO: 10.18 K/UL — SIGNIFICANT CHANGE UP (ref 3.8–10.5)

## 2018-07-17 PROCEDURE — 36415 COLL VENOUS BLD VENIPUNCTURE: CPT

## 2018-07-17 PROCEDURE — 93971 EXTREMITY STUDY: CPT | Mod: 26,LT

## 2018-07-17 PROCEDURE — 71046 X-RAY EXAM CHEST 2 VIEWS: CPT

## 2018-07-17 PROCEDURE — 99284 EMERGENCY DEPT VISIT MOD MDM: CPT | Mod: 25

## 2018-07-17 PROCEDURE — 85027 COMPLETE CBC AUTOMATED: CPT

## 2018-07-17 PROCEDURE — 96365 THER/PROPH/DIAG IV INF INIT: CPT

## 2018-07-17 PROCEDURE — 87040 BLOOD CULTURE FOR BACTERIA: CPT

## 2018-07-17 PROCEDURE — 83605 ASSAY OF LACTIC ACID: CPT

## 2018-07-17 PROCEDURE — 80053 COMPREHEN METABOLIC PANEL: CPT

## 2018-07-17 PROCEDURE — 71046 X-RAY EXAM CHEST 2 VIEWS: CPT | Mod: 26

## 2018-07-17 PROCEDURE — 96375 TX/PRO/DX INJ NEW DRUG ADDON: CPT

## 2018-07-17 PROCEDURE — 99284 EMERGENCY DEPT VISIT MOD MDM: CPT

## 2018-07-17 PROCEDURE — 93971 EXTREMITY STUDY: CPT

## 2018-07-17 RX ORDER — CEFTRIAXONE 500 MG/1
1 INJECTION, POWDER, FOR SOLUTION INTRAMUSCULAR; INTRAVENOUS ONCE
Qty: 0 | Refills: 0 | Status: COMPLETED | OUTPATIENT
Start: 2018-07-17 | End: 2018-07-17

## 2018-07-17 RX ORDER — SODIUM CHLORIDE 9 MG/ML
1000 INJECTION INTRAMUSCULAR; INTRAVENOUS; SUBCUTANEOUS
Qty: 0 | Refills: 0 | Status: COMPLETED | OUTPATIENT
Start: 2018-07-17 | End: 2018-07-17

## 2018-07-17 RX ORDER — KETOROLAC TROMETHAMINE 30 MG/ML
30 SYRINGE (ML) INJECTION ONCE
Qty: 0 | Refills: 0 | Status: DISCONTINUED | OUTPATIENT
Start: 2018-07-17 | End: 2018-07-17

## 2018-07-17 RX ADMIN — Medication 30 MILLIGRAM(S): at 23:11

## 2018-07-17 RX ADMIN — Medication 30 MILLIGRAM(S): at 21:14

## 2018-07-17 RX ADMIN — SODIUM CHLORIDE 1000 MILLILITER(S): 9 INJECTION INTRAMUSCULAR; INTRAVENOUS; SUBCUTANEOUS at 23:11

## 2018-07-17 RX ADMIN — SODIUM CHLORIDE 2000 MILLILITER(S): 9 INJECTION INTRAMUSCULAR; INTRAVENOUS; SUBCUTANEOUS at 21:15

## 2018-07-17 RX ADMIN — CEFTRIAXONE 1 GRAM(S): 500 INJECTION, POWDER, FOR SOLUTION INTRAMUSCULAR; INTRAVENOUS at 23:11

## 2018-07-17 RX ADMIN — CEFTRIAXONE 100 GRAM(S): 500 INJECTION, POWDER, FOR SOLUTION INTRAMUSCULAR; INTRAVENOUS at 21:16

## 2018-07-17 RX ADMIN — SODIUM CHLORIDE 2000 MILLILITER(S): 9 INJECTION INTRAMUSCULAR; INTRAVENOUS; SUBCUTANEOUS at 23:17

## 2018-07-17 NOTE — ED PROVIDER NOTE - PROGRESS NOTE DETAILS
Reevaluated patient at bedside.  Patient feeling much improved.  Discussed the results of all diagnostic testing in ED and copies of all reports given.   An opportunity to ask questions was given.  Discussed the importance of prompt, close medical follow-up.  Patient will return with any changes, concerns or persistent / worsening symptoms.  Understanding of all instructions verbalized.   All results were explained to patient and family and a copy of all available results given.   Dw pt re ortho prec / inst, need for fu with ORtho asap and to return with any changes. pt refused cane.

## 2018-07-17 NOTE — ED PROVIDER NOTE - CARE PLAN
Principal Discharge DX:	Upper respiratory tract infection, unspecified type  Secondary Diagnosis:	Acute pain of left knee

## 2018-07-17 NOTE — ED PROVIDER NOTE - PSH
S/P CABG x 3  3v CABG 2010 at [facility]  S/P peripheral artery angioplasty  11/2015 5 stents to right leg

## 2018-07-17 NOTE — ED PROVIDER NOTE - OBJECTIVE STATEMENT
57 yo M p/w pain behind L knee since this afternoon. 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 No fall / direct trauma. No numb/ting/focal weak. no redness / swelling noted. Pt also co cough, sore throat x past 2 days. No chest pain, no dyspnea. no weak / dizzy / malaise. no recent illness. no known fever, some chills today.  no Agg/allev factors. no dysphagia. No other co.

## 2018-07-17 NOTE — ED PROVIDER NOTE - CHPI ED SYMPTOMS NEG
no rash/no fever/no headache/no diarrhea/no decreased eating/drinking/no vomiting/no abdominal pain/no shortness of breath

## 2018-07-17 NOTE — ED PROVIDER NOTE - ENMT, MLM
Airway patent, Nasal mucosa clear. Mouth with normal mucosa. Throat has no vesicles, pos small oropharyngeal exudates, post pharyngeal erythema and uvula is midline. MM Moist. neck supple. no meningeal signs.

## 2018-07-17 NOTE — ED ADULT NURSE NOTE - PSH
S/P CABG x 3  3v CABG 2010 at [facility]  S/P peripheral artery angioplasty  11/2015 5 stents to right leg

## 2018-07-18 PROBLEM — J38.3 OTHER DISEASES OF VOCAL CORDS: Chronic | Status: ACTIVE | Noted: 2018-03-23

## 2018-07-18 PROBLEM — I73.9 PERIPHERAL VASCULAR DISEASE, UNSPECIFIED: Chronic | Status: ACTIVE | Noted: 2018-03-23

## 2018-07-18 PROBLEM — D09.9 CARCINOMA IN SITU, UNSPECIFIED: Chronic | Status: ACTIVE | Noted: 2018-04-30

## 2018-07-22 LAB
CULTURE RESULTS: SIGNIFICANT CHANGE UP
CULTURE RESULTS: SIGNIFICANT CHANGE UP
SPECIMEN SOURCE: SIGNIFICANT CHANGE UP
SPECIMEN SOURCE: SIGNIFICANT CHANGE UP

## 2018-08-28 ENCOUNTER — APPOINTMENT (OUTPATIENT)
Dept: OTOLARYNGOLOGY | Facility: CLINIC | Age: 58
End: 2018-08-28
Payer: COMMERCIAL

## 2018-08-28 VITALS
BODY MASS INDEX: 27.74 KG/M2 | WEIGHT: 183 LBS | HEIGHT: 68 IN | SYSTOLIC BLOOD PRESSURE: 120 MMHG | HEART RATE: 72 BPM | DIASTOLIC BLOOD PRESSURE: 83 MMHG

## 2018-08-28 PROCEDURE — 99214 OFFICE O/P EST MOD 30 MIN: CPT | Mod: 25

## 2018-08-28 PROCEDURE — 31575 DIAGNOSTIC LARYNGOSCOPY: CPT

## 2019-03-15 NOTE — H&P PST ADULT - [facility]
Education Materials    Handouts provided during this visit.  ________________________________________  RACHELE instructions provided during this visit.  ________________________________________  Websites discussed during this visit.  ________________________________________  Additional Materials       Medicare Wellness Visit  Plan for Preventive Care    A good way for you to stay healthy is to use preventive care.  Medicare covers many services that can help you stay healthy.* The goal of these services is to find any health problems as quickly as possible. Finding problems early can help make them easier to treat.  Your personal plan below lists the services you may need and when they are due.     Health Maintenance Summary     Topic Due On Due Status Completed On    MAMMOGRAM - BREAST CANCER SCREENING Mar 14, 2020 Not Due Mar 14, 2018    Osteoporosis Screening Feb 11, 2019 Overdue     Colorectal Cancer Screening - Colonoscopy May 29, 2019 Not Due May 29, 2014    Immunization - TDAP Pregnancy  Hidden     Medicare Wellness Visit Feb 11, 2019 Overdue     IMMUNIZATION - DTaP/Tdap/Td Jun 26, 2017 Overdue Jun 26, 2007    Immunization-Influenza  Completed Oct 1, 2018    Depression Screening Nov 27, 2018 Overdue Nov 27, 2017    Hepatitis C Screening  Completed Dec 1, 2016    Pneumococcal Vaccine 65+ Low/Medium Risk Feb 11, 2019 Overdue     Immunization - Shingles Aug 2, 2011 Overdue Jun 7, 2011    Immunization - MMR  Hidden            Preventive Care for Women and Men    Heart Screenings (Cardiovascular):  · Blood tests are used to check your cholesterol, lipid and triglyceride levels. High levels can increase your risk for heart disease and stroke. High levels can be treated with medications, diet and exercise. Lowering your levels can help keep your heart and blood vessels healthy.  Your provider will order these tests if they are needed.    · An ultrasound is done to see if you have an abdominal aortic aneurysm (AAA).   This is an enlargement of one of the main blood vessels that delivers blood to the body.   In the [place], 9,000 deaths are caused by AAA.  You may not even know you have this problem and as many as 1 in 3 people will have a serious problem if it is not treated.  Early diagnosis allows for more effective treatment and cure.  If you have a family history of AAA or are a male age 65-75 who has smoked, you are at higher risk of an AAA.  Your provider can order this test, if needed.    Colorectal Screening:  · There are many tests that are used to check for cancer of your colon and rectum. You and your provider should discuss what test is best for you and when to have it done.  Options include:  · Screening Colonoscopy: exam of the entire colon, seen through a flexible lighted tube.  · Flexible Sigmoidoscopy: exam of the last third (sigmoid portion) of the colon and rectum, seen through a flexible lighted tube.  · Cologuard DNA stool test: a sample of your stool is used to screen for cancer and unseen blood in your stool.  · Fecal Occult Blood Test: a sample of your stool is studied to find any unseen blood    Flu Shot:  · An immunization that helps to prevent influenza (the flu). You should get this every year. The best time to get the shot is in the fall.    Pneumococcal Shot:  • Vaccines are available that can help prevent pneumococcal disease, which is any type of infection caused by Streptococcus pneumoniae bacteria.   Their use can prevent some cases of pneumonia, meningitis, and sepsis. There are two types of pneumococcal vaccines:   o Conjugate vaccines (PCV-13 or Prevnar 13®) - helps protect against the 13 types of pneumococcal bacteria that are the most common causes of serious infections in children and adults.    o Polysaccharide vaccine (PPSV23 or [id number]®) - helps protect against 23 types of pneumococcal bacteria for patients who are recommended to get it.  These vaccines should be given at least  12 months apart.  A booster is usually not needed.     Hepatitis B Shot:  · An immunization that helps to protect people from getting Hepatitis B. Hepatitis B is a virus that spreads through contact with infected blood or body fluids. Many people with the virus do not have symptoms.  The virus can lead to serious problems, such as liver disease. Some people are at higher risk than others. Your doctor will tell you if you need this shot.     Diabetes Screening:  · A test to measure sugar (glucose) in your blood is called a fasting blood sugar. Fasting means you cannot have food or drink for at least 8 hours before the test. This test can detect diabetes long before you may notice symptoms.    Glaucoma Screening:  · Glaucoma screening is performed by your eye doctor. The test measures the fluid pressure inside your eyes to determine if you have glaucoma.     Hepatitis C Screening:  · A blood test to see if you have the hepatitis C virus.  Hepatitis C attacks the liver and is a major cause of chronic liver disease.  Medicare will cover a single screening for all adults born between 1945 & 1965, or high risk patients (people who have injected illegal drugs or people who have had blood transfusions).  High risk patients who continue to inject illegal drugs can be screened for Hepatitis C every year.    Smoking and Tobacco-Use Cessation Counseling:  · Tobacco is the single greatest cause of disease and early death in our country today. Medication and counseling together can increase a person’s chance of quitting for good.   · Medicare covers two quitting attempts per year, with four counseling sessions per attempt (eight sessions in a 12 month period)    Preventive Screening tests for Women    Screening Mammograms and Breast Exams:  · An x-ray of your breasts to check for breast cancer before you or your doctor may be able to feel it.  If breast cancer is found early it can usually be treated with success.    Pelvic Exams  and Pap Tests:  · An exam to check for cervical and vaginal cancer. A Pap test is a lab test in which cells are taken from your cervix and sent to the lab to look for signs of cervical cancer. If cancer of the cervix is found early, chances for a cure are good. Testing can generally end at age 65, or if a woman has a hysterectomy for a benign condition. Your provider may recommend more frequent testing if certain abnormal results are found.    Bone Mass Measurements:  · A painless x-ray of your bone density to see if you are at risk for a broken bone. Bone density refers to the thickness of bones or how tightly the bone tissue is packed.    Preventive Screening tests for Men    Prostate Screening:  · PSA - Prostate Cancer blood test.  Experts do not recommend routine screening of healthy men with no signs or symptoms of prostate disease.  However, men should not ignore urinary symptoms, and should discuss their family history with their doctor.    *Medicare pays for many preventive services to keep you healthy. For some of these services, you might have to pay a deductible, coinsurance, and / or copayment.  The amounts vary depending on the type of services you need and the kind of Medicare health plan you have.               regular rate and rhythm/normal PMI

## 2019-10-13 ENCOUNTER — EMERGENCY (EMERGENCY)
Facility: HOSPITAL | Age: 59
LOS: 1 days | Discharge: HOME | End: 2019-10-13
Attending: EMERGENCY MEDICINE | Admitting: EMERGENCY MEDICINE
Payer: COMMERCIAL

## 2019-10-13 VITALS
RESPIRATION RATE: 16 BRPM | DIASTOLIC BLOOD PRESSURE: 74 MMHG | OXYGEN SATURATION: 100 % | TEMPERATURE: 98 F | HEART RATE: 70 BPM | SYSTOLIC BLOOD PRESSURE: 128 MMHG

## 2019-10-13 VITALS
TEMPERATURE: 98 F | HEART RATE: 69 BPM | OXYGEN SATURATION: 96 % | HEIGHT: 69 IN | RESPIRATION RATE: 15 BRPM | SYSTOLIC BLOOD PRESSURE: 137 MMHG | DIASTOLIC BLOOD PRESSURE: 87 MMHG | WEIGHT: 190.04 LBS

## 2019-10-13 DIAGNOSIS — Z98.62 PERIPHERAL VASCULAR ANGIOPLASTY STATUS: Chronic | ICD-10-CM

## 2019-10-13 DIAGNOSIS — Z95.1 PRESENCE OF AORTOCORONARY BYPASS GRAFT: Chronic | ICD-10-CM

## 2019-10-13 LAB
ALBUMIN SERPL ELPH-MCNC: 3.7 G/DL — SIGNIFICANT CHANGE UP (ref 3.3–5)
ALP SERPL-CCNC: 50 U/L — SIGNIFICANT CHANGE UP (ref 40–120)
ALT FLD-CCNC: 45 U/L — SIGNIFICANT CHANGE UP (ref 12–78)
ANION GAP SERPL CALC-SCNC: 5 MMOL/L — SIGNIFICANT CHANGE UP (ref 5–17)
APPEARANCE UR: CLEAR — SIGNIFICANT CHANGE UP
AST SERPL-CCNC: 28 U/L — SIGNIFICANT CHANGE UP (ref 15–37)
BASOPHILS # BLD AUTO: 0.04 K/UL — SIGNIFICANT CHANGE UP (ref 0–0.2)
BASOPHILS NFR BLD AUTO: 0.4 % — SIGNIFICANT CHANGE UP (ref 0–2)
BILIRUB SERPL-MCNC: 0.6 MG/DL — SIGNIFICANT CHANGE UP (ref 0.2–1.2)
BILIRUB UR-MCNC: NEGATIVE — SIGNIFICANT CHANGE UP
BUN SERPL-MCNC: 14 MG/DL — SIGNIFICANT CHANGE UP (ref 7–23)
CALCIUM SERPL-MCNC: 8.3 MG/DL — LOW (ref 8.5–10.1)
CHLORIDE SERPL-SCNC: 108 MMOL/L — SIGNIFICANT CHANGE UP (ref 96–108)
CO2 SERPL-SCNC: 25 MMOL/L — SIGNIFICANT CHANGE UP (ref 22–31)
COLOR SPEC: YELLOW — SIGNIFICANT CHANGE UP
CREAT SERPL-MCNC: 0.68 MG/DL — SIGNIFICANT CHANGE UP (ref 0.5–1.3)
DIFF PNL FLD: NEGATIVE — SIGNIFICANT CHANGE UP
EOSINOPHIL # BLD AUTO: 0.09 K/UL — SIGNIFICANT CHANGE UP (ref 0–0.5)
EOSINOPHIL NFR BLD AUTO: 0.8 % — SIGNIFICANT CHANGE UP (ref 0–6)
EPI CELLS # UR: SIGNIFICANT CHANGE UP
GLUCOSE SERPL-MCNC: 91 MG/DL — SIGNIFICANT CHANGE UP (ref 70–99)
GLUCOSE UR QL: NEGATIVE — SIGNIFICANT CHANGE UP
HCT VFR BLD CALC: 41.5 % — SIGNIFICANT CHANGE UP (ref 39–50)
HGB BLD-MCNC: 14.5 G/DL — SIGNIFICANT CHANGE UP (ref 13–17)
IMM GRANULOCYTES NFR BLD AUTO: 0.4 % — SIGNIFICANT CHANGE UP (ref 0–1.5)
KETONES UR-MCNC: NEGATIVE — SIGNIFICANT CHANGE UP
LEUKOCYTE ESTERASE UR-ACNC: NEGATIVE — SIGNIFICANT CHANGE UP
LYMPHOCYTES # BLD AUTO: 18.5 % — SIGNIFICANT CHANGE UP (ref 13–44)
LYMPHOCYTES # BLD AUTO: 2 K/UL — SIGNIFICANT CHANGE UP (ref 1–3.3)
MCHC RBC-ENTMCNC: 29.9 PG — SIGNIFICANT CHANGE UP (ref 27–34)
MCHC RBC-ENTMCNC: 34.9 GM/DL — SIGNIFICANT CHANGE UP (ref 32–36)
MCV RBC AUTO: 85.6 FL — SIGNIFICANT CHANGE UP (ref 80–100)
MONOCYTES # BLD AUTO: 0.78 K/UL — SIGNIFICANT CHANGE UP (ref 0–0.9)
MONOCYTES NFR BLD AUTO: 7.2 % — SIGNIFICANT CHANGE UP (ref 2–14)
NEUTROPHILS # BLD AUTO: 7.85 K/UL — HIGH (ref 1.8–7.4)
NEUTROPHILS NFR BLD AUTO: 72.7 % — SIGNIFICANT CHANGE UP (ref 43–77)
NITRITE UR-MCNC: NEGATIVE — SIGNIFICANT CHANGE UP
NRBC # BLD: 0 /100 WBCS — SIGNIFICANT CHANGE UP (ref 0–0)
PH UR: 7 — SIGNIFICANT CHANGE UP (ref 5–8)
PLATELET # BLD AUTO: 297 K/UL — SIGNIFICANT CHANGE UP (ref 150–400)
POTASSIUM SERPL-MCNC: 4 MMOL/L — SIGNIFICANT CHANGE UP (ref 3.5–5.3)
POTASSIUM SERPL-SCNC: 4 MMOL/L — SIGNIFICANT CHANGE UP (ref 3.5–5.3)
PROT SERPL-MCNC: 6.5 G/DL — SIGNIFICANT CHANGE UP (ref 6–8.3)
PROT UR-MCNC: NEGATIVE — SIGNIFICANT CHANGE UP
RBC # BLD: 4.85 M/UL — SIGNIFICANT CHANGE UP (ref 4.2–5.8)
RBC # FLD: 13.1 % — SIGNIFICANT CHANGE UP (ref 10.3–14.5)
SODIUM SERPL-SCNC: 138 MMOL/L — SIGNIFICANT CHANGE UP (ref 135–145)
SP GR SPEC: 1.01 — SIGNIFICANT CHANGE UP (ref 1.01–1.02)
UROBILINOGEN FLD QL: NEGATIVE — SIGNIFICANT CHANGE UP
WBC # BLD: 10.8 K/UL — HIGH (ref 3.8–10.5)
WBC # FLD AUTO: 10.8 K/UL — HIGH (ref 3.8–10.5)

## 2019-10-13 PROCEDURE — 87086 URINE CULTURE/COLONY COUNT: CPT

## 2019-10-13 PROCEDURE — 36415 COLL VENOUS BLD VENIPUNCTURE: CPT

## 2019-10-13 PROCEDURE — 81001 URINALYSIS AUTO W/SCOPE: CPT

## 2019-10-13 PROCEDURE — 85027 COMPLETE CBC AUTOMATED: CPT

## 2019-10-13 PROCEDURE — 80053 COMPREHEN METABOLIC PANEL: CPT

## 2019-10-13 PROCEDURE — 74176 CT ABD & PELVIS W/O CONTRAST: CPT | Mod: 26

## 2019-10-13 PROCEDURE — 96375 TX/PRO/DX INJ NEW DRUG ADDON: CPT

## 2019-10-13 PROCEDURE — 99284 EMERGENCY DEPT VISIT MOD MDM: CPT | Mod: 25

## 2019-10-13 PROCEDURE — 74176 CT ABD & PELVIS W/O CONTRAST: CPT

## 2019-10-13 PROCEDURE — 96374 THER/PROPH/DIAG INJ IV PUSH: CPT

## 2019-10-13 PROCEDURE — 99284 EMERGENCY DEPT VISIT MOD MDM: CPT

## 2019-10-13 RX ORDER — DEXAMETHASONE 0.5 MG/5ML
10 ELIXIR ORAL ONCE
Refills: 0 | Status: COMPLETED | OUTPATIENT
Start: 2019-10-13 | End: 2019-10-13

## 2019-10-13 RX ORDER — DEXAMETHASONE 0.5 MG/5ML
10 ELIXIR ORAL ONCE
Refills: 0 | Status: DISCONTINUED | OUTPATIENT
Start: 2019-10-13 | End: 2019-10-13

## 2019-10-13 RX ORDER — LIDOCAINE 4 G/100G
1 CREAM TOPICAL
Qty: 6 | Refills: 0
Start: 2019-10-13 | End: 2019-10-15

## 2019-10-13 RX ORDER — LIDOCAINE 4 G/100G
1 CREAM TOPICAL ONCE
Refills: 0 | Status: COMPLETED | OUTPATIENT
Start: 2019-10-13 | End: 2019-10-13

## 2019-10-13 RX ORDER — IBUPROFEN 200 MG
1 TABLET ORAL
Qty: 21 | Refills: 0
Start: 2019-10-13 | End: 2019-10-19

## 2019-10-13 RX ORDER — SODIUM CHLORIDE 9 MG/ML
1000 INJECTION INTRAMUSCULAR; INTRAVENOUS; SUBCUTANEOUS ONCE
Refills: 0 | Status: COMPLETED | OUTPATIENT
Start: 2019-10-13 | End: 2019-10-13

## 2019-10-13 RX ORDER — KETOROLAC TROMETHAMINE 30 MG/ML
30 SYRINGE (ML) INJECTION ONCE
Refills: 0 | Status: DISCONTINUED | OUTPATIENT
Start: 2019-10-13 | End: 2019-10-13

## 2019-10-13 RX ORDER — DIAZEPAM 5 MG
5 TABLET ORAL ONCE
Refills: 0 | Status: DISCONTINUED | OUTPATIENT
Start: 2019-10-13 | End: 2019-10-13

## 2019-10-13 RX ORDER — METHOCARBAMOL 500 MG/1
1 TABLET, FILM COATED ORAL
Qty: 15 | Refills: 0
Start: 2019-10-13 | End: 2019-10-17

## 2019-10-13 RX ADMIN — LIDOCAINE 1 PATCH: 4 CREAM TOPICAL at 13:28

## 2019-10-13 RX ADMIN — Medication 5 MILLIGRAM(S): at 13:02

## 2019-10-13 RX ADMIN — Medication 30 MILLIGRAM(S): at 11:45

## 2019-10-13 RX ADMIN — Medication 30 MILLIGRAM(S): at 11:30

## 2019-10-13 RX ADMIN — Medication 102 MILLIGRAM(S): at 13:28

## 2019-10-13 RX ADMIN — SODIUM CHLORIDE 1000 MILLILITER(S): 9 INJECTION INTRAMUSCULAR; INTRAVENOUS; SUBCUTANEOUS at 12:00

## 2019-10-13 NOTE — ED PROVIDER NOTE - CHPI ED SYMPTOMS NEG
no vomiting/no abdominal distension/no dysuria/no burning urination/no nausea/no chills/no diarrhea/no fever/no hematuria

## 2019-10-13 NOTE — ED PROVIDER NOTE - PROGRESS NOTE DETAILS
labs u/a and ct wnl pain worse with movement treated with decadron valium pain improved will dc home likely muscular pain advised pcp f/u

## 2019-10-13 NOTE — ED PROVIDER NOTE - OBJECTIVE STATEMENT
Pt is a 60 yo male with pmhx of ht hld cad on plavix c/o of left flank pain for 3 days getting worse no nvd no fever chills dysuria hematuria chest pain sob. Pt states pain worse with movement denies any trauma heavy lifting. pt took motrin for pain yesterday.

## 2019-10-13 NOTE — ED PROVIDER NOTE - PATIENT PORTAL LINK FT
You can access the FollowMyHealth Patient Portal offered by [facility] by registering at the following website: [email or web address]. By joining [employer]’s FollowMyHealth portal, you will also be able to view your health information using other applications (apps) compatible with our system.

## 2019-10-13 NOTE — ED ADULT NURSE NOTE - OBJECTIVE STATEMENT
Present to ER with c/o left flank pain. Denies any injury. Denies any shortness of breath or chest pain

## 2019-10-13 NOTE — ED PROVIDER NOTE - ATTENDING CONTRIBUTION TO CARE
Pt is a 60 yo male who presents to the ED with a cc of back pain.  PMHx of CAD (coronary artery disease) of artery bypass graft , Carcinoma in situ  of larynx, Claudication of both lower extremities, CVA (cerebral vascular accident)  secondary to heart surgery, HTN, HLD, vocal cord disease.  Pt reports that he has been experiencing worsening left flank pain over the last 3 days.  Denies any acute trauma or heavy lifting.  Pain is worse with movement.  he reports that he took Motrin yesterday for the pain with little to no relief in symptoms.  Denies radiation of pain.  Denies prior similar episodes of pain.  Denies fever, chills, N/V, CP, SOB, abd pain.  Denies ext numbness or weakness.  Denies noting hematuria, urinary frequency or urgency.  Denies loss of bowel or bladder function.  On exam pt lying in bed NAD, NCAT, PERRL, EOMI, heart RRR, lungs CTA, abd soft NT/ND.  No midline C/T/L TTP no acute step offs or deformities noted TTP to left paraspinal lower thoracic upper lumbar region with increased tone noted.  No overlying skin changes noted.  NVI to UE and LE bilaterally.  Will obtain screening labs, CT renal stone hunt.  Will medicate for pain.  Agree with above plan of care

## 2019-10-13 NOTE — ED PROVIDER NOTE - PSH
S/P CABG x 3  3v CABG 2010 at [facility]  S/P peripheral artery angioplasty  11/2015 5 stents to right leg

## 2019-10-14 LAB
CULTURE RESULTS: SIGNIFICANT CHANGE UP
SPECIMEN SOURCE: SIGNIFICANT CHANGE UP

## 2020-01-16 ENCOUNTER — EMERGENCY (EMERGENCY)
Facility: HOSPITAL | Age: 60
LOS: 1 days | Discharge: HOME | End: 2020-01-16
Attending: STUDENT IN AN ORGANIZED HEALTH CARE EDUCATION/TRAINING PROGRAM | Admitting: EMERGENCY MEDICINE
Payer: COMMERCIAL

## 2020-01-16 VITALS
HEIGHT: 69 IN | SYSTOLIC BLOOD PRESSURE: 155 MMHG | WEIGHT: 192.9 LBS | RESPIRATION RATE: 16 BRPM | HEART RATE: 85 BPM | OXYGEN SATURATION: 98 % | DIASTOLIC BLOOD PRESSURE: 92 MMHG | TEMPERATURE: 98 F

## 2020-01-16 DIAGNOSIS — Z95.1 PRESENCE OF AORTOCORONARY BYPASS GRAFT: Chronic | ICD-10-CM

## 2020-01-16 DIAGNOSIS — Z98.62 PERIPHERAL VASCULAR ANGIOPLASTY STATUS: Chronic | ICD-10-CM

## 2020-01-16 PROCEDURE — 71046 X-RAY EXAM CHEST 2 VIEWS: CPT

## 2020-01-16 PROCEDURE — 99283 EMERGENCY DEPT VISIT LOW MDM: CPT

## 2020-01-16 PROCEDURE — 71046 X-RAY EXAM CHEST 2 VIEWS: CPT | Mod: 26

## 2020-01-16 PROCEDURE — 99283 EMERGENCY DEPT VISIT LOW MDM: CPT | Mod: 25

## 2020-01-16 RX ORDER — LIDOCAINE 4 G/100G
1 CREAM TOPICAL
Qty: 4 | Refills: 0
Start: 2020-01-16

## 2020-01-16 RX ORDER — LIDOCAINE 4 G/100G
1 CREAM TOPICAL ONCE
Refills: 0 | Status: COMPLETED | OUTPATIENT
Start: 2020-01-16 | End: 2020-01-16

## 2020-01-16 RX ORDER — ACETAMINOPHEN 500 MG
650 TABLET ORAL ONCE
Refills: 0 | Status: COMPLETED | OUTPATIENT
Start: 2020-01-16 | End: 2020-01-16

## 2020-01-16 RX ORDER — CYCLOBENZAPRINE HYDROCHLORIDE 10 MG/1
10 TABLET, FILM COATED ORAL ONCE
Refills: 0 | Status: COMPLETED | OUTPATIENT
Start: 2020-01-16 | End: 2020-01-16

## 2020-01-16 RX ORDER — CYCLOBENZAPRINE HYDROCHLORIDE 10 MG/1
1 TABLET, FILM COATED ORAL
Qty: 10 | Refills: 0
Start: 2020-01-16 | End: 2020-01-18

## 2020-01-16 RX ORDER — IBUPROFEN 200 MG
1 TABLET ORAL
Qty: 21 | Refills: 0
Start: 2020-01-16 | End: 2020-01-22

## 2020-01-16 RX ORDER — IBUPROFEN 200 MG
600 TABLET ORAL ONCE
Refills: 0 | Status: COMPLETED | OUTPATIENT
Start: 2020-01-16 | End: 2020-01-16

## 2020-01-16 RX ADMIN — Medication 600 MILLIGRAM(S): at 07:44

## 2020-01-16 RX ADMIN — LIDOCAINE 1 PATCH: 4 CREAM TOPICAL at 08:49

## 2020-01-16 RX ADMIN — Medication 650 MILLIGRAM(S): at 07:45

## 2020-01-16 RX ADMIN — CYCLOBENZAPRINE HYDROCHLORIDE 10 MILLIGRAM(S): 10 TABLET, FILM COATED ORAL at 08:49

## 2020-01-16 NOTE — ED PROVIDER NOTE - OBJECTIVE STATEMENT
58 y/o M with PMH CAD s/p CABG ~10years prior on plavix p/w posterior rib pain after fall yesterday. Pt reports having pain in his right posterior chest after a fall in the shower. Pain is located over his right side on the lateral chest. Pain is 8/10 worse with breathing and with palpation. He denies chest pain, abdominal pain, shortness of breath cough. Pt reports taking deep breaths or moving makes the pain worse. He denies fever, chills, nausea, vomiting, diarrhea, dysuria.  no numbness or weakness. no midline tenderness   No allergies.

## 2020-01-16 NOTE — ED ADULT NURSE NOTE - PSH
S/P CABG x 3  3v CABG 2010 at [facility]  S/P peripheral artery angioplasty  11/2015 5 stents to right leg

## 2020-01-16 NOTE — ED ADULT NURSE NOTE - NSIMPLEMENTINTERV_GEN_ALL_ED
Implemented All Universal Safety Interventions:  [place] to call system. Call bell, personal items and telephone within reach. Instruct patient to call for assistance. Room bathroom lighting operational. Non-slip footwear when patient is off stretcher. Physically safe environment: no spills, clutter or unnecessary equipment. Stretcher in lowest position, wheels locked, appropriate side rails in place.

## 2020-01-16 NOTE — ED PROVIDER NOTE - CARE PLAN
Principal Discharge DX:	Rib pain  Assessment and plan of treatment:	During your ED visit you were evaluated for right sided back pain . You had an xray done which showed no acute fracture. You may have bruising to your rib take motrin 600mg every 8 hours for up to 5 days. Follow up with your pmd within 1 week. Return to the ED if you exhibit any new, continued or worsening symptoms. Principal Discharge DX:	Rib pain  Assessment and plan of treatment:	During your ED visit you were evaluated for right sided back pain . You had an xray done which showed no acute fracture. You may have bruising to your rib take motrin 600mg every 8 hours for up to 5 days Take cylcobenzaprine 10mg every 8 hours as needed for muscle spasm, do not drink or drive after taking this medication. Follow up with your pmd within 1 week. Return to the ED if you exhibit any new, continued or worsening symptoms.

## 2020-01-16 NOTE — ED ADULT NURSE NOTE - ISOLATION TYPE:
None

## 2020-01-16 NOTE — ED PROVIDER NOTE - PLAN OF CARE
During your ED visit you were evaluated for right sided back pain . You had an xray done which showed no acute fracture. You may have bruising to your rib take motrin 600mg every 8 hours for up to 5 days. Follow up with your pmd within 1 week. Return to the ED if you exhibit any new, continued or worsening symptoms. During your ED visit you were evaluated for right sided back pain . You had an xray done which showed no acute fracture. You may have bruising to your rib take motrin 600mg every 8 hours for up to 5 days Take cylcobenzaprine 10mg every 8 hours as needed for muscle spasm, do not drink or drive after taking this medication. Follow up with your pmd within 1 week. Return to the ED if you exhibit any new, continued or worsening symptoms.

## 2020-01-16 NOTE — ED PROVIDER NOTE - PSH
S/P CABG x 3  3v CABG 2010 at [facility]  S/P peripheral artery angioplasty  11/2015 5 stents to right leg

## 2020-01-16 NOTE — ED PROVIDER NOTE - CLINICAL SUMMARY MEDICAL DECISION MAKING FREE TEXT BOX
58 y/o M with pmh of CAD  s/p cabg pvd s/p stent , p/w fall with right sided posterior back pain. Patient with point tenderness over ribs on posterior chest worse with movement and palpation. improved with ibuprofen yesterday. No chest pain, no SOB, well appearing. pt with likely muscle vs rib injury. will obtain cxr , medicate with ibuprofen + tylenol reassess.

## 2020-01-16 NOTE — ED PROVIDER NOTE - PATIENT PORTAL LINK FT
You can access the FollowMyHealth Patient Portal offered by [facility] by registering at the following website: [email or web address]. By joining [employer]’s FollowMyHealth portal, you will also be able to view your health information using other applications (apps) compatible with our system.

## 2020-01-17 ENCOUNTER — EMERGENCY (EMERGENCY)
Facility: HOSPITAL | Age: 60
LOS: 1 days | Discharge: HOME | End: 2020-01-17
Attending: EMERGENCY MEDICINE | Admitting: EMERGENCY MEDICINE
Payer: COMMERCIAL

## 2020-01-17 VITALS
OXYGEN SATURATION: 99 % | SYSTOLIC BLOOD PRESSURE: 162 MMHG | HEIGHT: 69 IN | HEART RATE: 78 BPM | RESPIRATION RATE: 15 BRPM | WEIGHT: 195.11 LBS | TEMPERATURE: 96 F | DIASTOLIC BLOOD PRESSURE: 84 MMHG

## 2020-01-17 DIAGNOSIS — E78.00 PURE HYPERCHOLESTEROLEMIA, UNSPECIFIED: ICD-10-CM

## 2020-01-17 DIAGNOSIS — Z95.1 PRESENCE OF AORTOCORONARY BYPASS GRAFT: Chronic | ICD-10-CM
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
## 2020-01-17 DIAGNOSIS — Z79.02 LONG TERM (CURRENT) USE OF ANTITHROMBOTICS/ANTIPLATELETS: ICD-10-CM
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
## 2020-01-17 DIAGNOSIS — E78.5 HYPERLIPIDEMIA, UNSPECIFIED: ICD-10-CM

## 2020-01-17 DIAGNOSIS — Z95.1 PRESENCE OF AORTOCORONARY BYPASS GRAFT: ICD-10-CM

## 2020-01-17 DIAGNOSIS — S22.31XA FRACTURE OF ONE RIB, RIGHT SIDE, INITIAL ENCOUNTER FOR CLOSED FRACTURE: ICD-10-CM

## 2020-01-17 DIAGNOSIS — Z95.5 PRESENCE OF CORONARY ANGIOPLASTY IMPLANT AND GRAFT: ICD-10-CM

## 2020-01-17 DIAGNOSIS — Z98.62 PERIPHERAL VASCULAR ANGIOPLASTY STATUS: Chronic | ICD-10-CM

## 2020-01-17 DIAGNOSIS — I73.9 PERIPHERAL VASCULAR DISEASE, UNSPECIFIED: ICD-10-CM

## 2020-01-17 DIAGNOSIS — I25.10 ATHEROSCLEROTIC HEART DISEASE OF NATIVE CORONARY ARTERY WITHOUT ANGINA PECTORIS: ICD-10-CM

## 2020-01-17 DIAGNOSIS — Z86.73 PERSONAL HISTORY OF TRANSIENT ISCHEMIC ATTACK (TIA), AND CEREBRAL INFARCTION WITHOUT RESIDUAL DEFICITS: ICD-10-CM
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
## 2020-01-17 DIAGNOSIS — I10 ESSENTIAL (PRIMARY) HYPERTENSION: ICD-10-CM

## 2020-01-17 DIAGNOSIS — R07.81 PLEURODYNIA: ICD-10-CM

## 2020-01-17 PROCEDURE — L9994: CPT

## 2020-01-17 PROCEDURE — 99283 EMERGENCY DEPT VISIT LOW MDM: CPT

## 2020-01-17 NOTE — ED PROVIDER NOTE - PHYSICAL EXAMINATION
GEN: awake, alert, well appearing, uncomfortable   HENT: atraumatic, normocephalic, RENNY, EOMI, no midline instability, oropharynx w/o erythema or exudates, no lymphadenopathy  CV: normal rate and rhythm, S1, S2, no MRG, equal pulses throughout, no JVD  RESP: no distress, no IWOB, no retraction, clear to auscultation bilaterally   ABD: soft, nontender, nondistended, no rebound, no guarding, normoactive bowel sounds, no organomegally  MUSCULOSKELETAL: strenght 5/5 x 4, full range of motion, CMS intact   SKIN: normal color, no turgor, no wounds or rash   NEURO: Awake alert oriented x 3, no facial asymmetry, no slurred speech, no pronator drift, moving all extremities  PSYCH: no suicial ideation, no homicidal ideation, no depression, no anxiety, no hallucination

## 2020-01-17 NOTE — ED ADULT NURSE NOTE - PSH
S/P CABG x 3  3v CABG 2010 at [facility]  S/P peripheral artery angioplasty  11/2015 5 stents to right leg

## 2020-01-17 NOTE — ED PROVIDER NOTE - CLINICAL SUMMARY MEDICAL DECISION MAKING FREE TEXT BOX
60yo M seen one day prior returns for rib fx   prior CXR reviewed, single rib fx noted, will prescribe pain meds, pt has IS at home available. pt to f/u w/ pcp for further eval and mgmt

## 2020-01-17 NOTE — ED PROVIDER NOTE - NS ED ROS FT
GEN: no fever, no chills, no weakness  HENT: no eye pain, no visual changes, no ear pain, no visual or hearing changes, no sore throat, no swelling or neck pain  CV: no chest pain, no palpitations, no dizziness, no swelling  RESP: no coughing, no sob, no IWOB, no WOLFE  GI: no abd pain, no distension, no nausea, no vomiting, no diarrhea, no constipation  : no dysuria,  no frequency, no hematuria, no discharge, no flank pain  MUSCULOSKELETAL: no myalgia, no arthralgia, no joint swelling, no bruising   SKIN: no rash, no wounds, no itching  NEURO: no change in mentation, no visual changes, no HA, no focal weakness, no trouble speaking, no gait abnormalities, no dizziness  PSYCH: no suidical ideation, no homicidal ideation, no depression, no anxiety, no hallucinations

## 2020-01-17 NOTE — ED ADULT TRIAGE NOTE - CHIEF COMPLAINT QUOTE
pt states he was seen yesterday, d/c'd with rx ibuprofen and muscle relaxer. Pt states his pain is too much, called after d/c and told he has rib fx

## 2020-01-17 NOTE — ED PROVIDER NOTE - CARE PLAN
Assessment and plan of treatment:	60yo M seen one day prior returns for rib fx Principal Discharge DX:	Closed fracture of one rib with routine healing, unspecified laterality, subsequent encounter  Assessment and plan of treatment:	60yo M seen one day prior returns for rib fx

## 2020-01-17 NOTE — ED PROVIDER NOTE - NSFOLLOWUPINSTRUCTIONS_ED_ALL_ED_FT
EnglishSpanish    Rib Fracture     A rib fracture is a break or crack in one of the bones of the ribs. The ribs are like a cage that goes around your upper chest. A broken or cracked rib is often painful, but most do not cause other problems. Most rib fractures usually heal on their own in 1–3 months.  Follow these instructions at home:  Managing pain, stiffness, and swelling     If directed, apply ice to the injured area.  Put ice in a plastic bag.Place a towel between your skin and the bag.Leave the ice on for 20 minutes, 2–3 times a day.Take over-the-counter and prescription medicines only as told by your doctor.Activity     Avoid activities that cause pain to the injured area. Protect your injured area.Slowly increase activity as told by your doctor.General instructions     Do deep breathing as told by your doctor. You may be told to:  Take deep breaths many times a day.Cough many times a day while hugging a pillow.Use a device (incentive spirometer) to do deep breathing many times a day.Drink enough fluid to keep your pee (urine) clear or pale yellow.Do not wear a rib belt or binder. These do not allow you to breathe deeply.Keep all follow-up visits as told by your doctor. This is important.Contact a doctor if:  You have a fever.Get help right away if:  You have trouble breathing.You are short of breath.You cannot stop coughing.You cough up thick or bloody spit (sputum).You feel sick to your stomach (nauseous), throw up (vomit), or have belly (abdominal) pain.Your pain gets worse and medicine does not help.Summary  A rib fracture is a break or crack in one of the bones of the ribs.Apply ice to the injured area and take medicines for pain as told by your doctor.Take deep breaths and cough many times a day. Hug a pillow every time you cough.This information is not intended to replace advice given to you by your health care provider. Make sure you discuss any questions you have with your health care provider.    Document Released: 09/26/2009 Document Revised: 03/20/2018 Document Reviewed: 03/20/2018  [employer] Patient Education © 2019 [employer]

## 2020-01-17 NOTE — ED PROVIDER NOTE - PATIENT PORTAL LINK FT
You can access the FollowMyHealth Patient Portal offered by [facility] by registering at the following website: [email or web address]. By joining [employer]’s FollowMyHealth portal, you will also be able to view your health information using other applications (apps) compatible with our system.

## 2020-01-17 NOTE — ED ADULT NURSE NOTE - NSIMPLEMENTINTERV_GEN_ALL_ED
Implemented All Fall Risk Interventions:  [place] to call system. Call bell, personal items and telephone within reach. Instruct patient to call for assistance. Room bathroom lighting operational. Non-slip footwear when patient is off stretcher. Physically safe environment: no spills, clutter or unnecessary equipment. Stretcher in lowest position, wheels locked, appropriate side rails in place. Provide visual cue, wrist band, yellow gown, etc. Monitor gait and stability. Monitor for mental status changes and reorient to person, place, and time. Review medications for side effects contributing to fall risk. Reinforce activity limits and safety measures with patient and family.

## 2022-07-18 NOTE — ED ADULT TRIAGE NOTE - PAIN: PRESENCE, MLM
Francois was 300 pounds and has lost weight through diet. He enjoys Chinese vegetables.  Rides a bike mostly on the weekends.     complains of pain/discomfort

## 2023-04-12 NOTE — ED ADULT NURSE NOTE - NSFALLRSKOUTCOME_ED_ALL_ED
Hospitalist Discharge orders  *Notify your healthcare provider if you experience any of the following: temperature >100.4  * Notify your healthcare provider if you experience any of the following: redness, tenderness.    *Notify your healthcare provider if you experience any of the following: difficulty breathing or     increased cough.  *Notify your physician if you experience any persistent nausea, vomiting, or diarrhea or headache  *Notify your physician if you experience any of the following:severe uncontrolled pain;worsening rash, increased confusion or weakness; dizziness, lightheadedness or visual disturbances       For you atrial fibrillation take your blood thinner- Eliquis- and your heart rate medicine- metoprolol.  Taper you steroid dose down over the next few weeks.    Use your scheduled nebulizers (Pulmicort and atrovent) whether you have symptoms or not  Use your albuterol if you feel SOB or are wheezing.  
Universal Safety Interventions

## 2023-06-01 NOTE — ED ADULT TRIAGE NOTE - CADM TRG TX PRIOR TO ARRIVAL
medications Metronidazole Pregnancy And Lactation Text: This medication is Pregnancy Category B and considered safe during pregnancy.  It is also excreted in breast milk.

## 2023-07-26 NOTE — ED ADULT NURSE NOTE - NS ED PATIENT SAFETY CONCERN
Local Anesthesia Pre Procedure Assessment    Informed Consent:    Consent Obtained: Yes       Procedure Assessment:  Completed        Planned Anesthetic: Local    Medical History/Comorbid Conditions:    Significant Medical/Surgical History: (See H&P)  Normal Mental Status: Yes    Examination Pertinent to Procedure Being Performed:  Refer to Progress Note.       Other Findings:    Reviewed Current Medications and Allergies: Yes    Pertinent Lab/Diagnostic Tests:    Pertinent Lab / Diagnostic Tests: (See H&P)        Nichol Coffman MD  3:11 PM  7/26/2023             No

## 2023-11-13 NOTE — ASU PATIENT PROFILE, ADULT - TEACHING/LEARNING DEVELOPMENTAL CONSIDERATIONS
Supplement questions - Ryze mushroom coffee?  And Umary joint supplement?  Safety? Interactions?  Thank you!
none

## 2024-03-01 NOTE — ED PROVIDER NOTE - SKIN, MLM
Intubation    Date/Time: 3/1/2024 4:25 PM    Performed by: Jackson Mensah CRNA  Authorized by: Jamie Ardon MD    Intubation:     Induction:  Rapid sequence induction    Intubated:  Postinduction    Mask Ventilation:  Not attempted    Attempted By:  CRNA    Method of Intubation:  Video laryngoscopy    Blade:  Duque 3    Laryngeal View Grade: Grade I - full view of cords      Difficult Airway Encountered?: No      Complications:  None    Airway Device:  Oral endotracheal tube    Airway Device Size:  7.5    Style/Cuff Inflation:  Cuffed (inflated to minimal occlusive pressure)    Tube secured:  23    Secured at:  The lips    Placement Verified By:  Capnometry    Complicating Factors:  None    Findings Post-Intubation:  BS equal bilateral and atraumatic/condition of teeth unchanged       Skin normal color for race, warm, dry and intact. No evidence of rash.

## 2024-03-02 ENCOUNTER — NON-APPOINTMENT (OUTPATIENT)
Age: 64
End: 2024-03-02

## 2024-03-08 ENCOUNTER — NON-APPOINTMENT (OUTPATIENT)
Age: 64
End: 2024-03-08

## 2024-03-17 ENCOUNTER — NON-APPOINTMENT (OUTPATIENT)
Age: 64
End: 2024-03-17

## 2024-06-28 ENCOUNTER — APPOINTMENT (OUTPATIENT)
Dept: OTOLARYNGOLOGY | Facility: CLINIC | Age: 64
End: 2024-06-28

## 2024-06-28 VITALS
HEIGHT: 68 IN | HEART RATE: 79 BPM | DIASTOLIC BLOOD PRESSURE: 85 MMHG | OXYGEN SATURATION: 96 % | SYSTOLIC BLOOD PRESSURE: 130 MMHG | WEIGHT: 196 LBS | BODY MASS INDEX: 29.7 KG/M2

## 2024-06-28 DIAGNOSIS — D02.0 CARCINOMA IN SITU OF LARYNX: ICD-10-CM

## 2024-06-28 PROCEDURE — 99203 OFFICE O/P NEW LOW 30 MIN: CPT | Mod: 25

## 2024-06-28 PROCEDURE — 31575 DIAGNOSTIC LARYNGOSCOPY: CPT

## 2024-07-16 ENCOUNTER — NON-APPOINTMENT (OUTPATIENT)
Age: 64
End: 2024-07-16

## 2024-08-07 NOTE — ED PROVIDER NOTE - SKIN, MLM
----- Message from Nurse Deisy LILLY sent at 8/6/2024  9:44 AM EDT -----  Regarding: Appointment  Can you call Hyacinth and help her schedule a mammogram, she will be home after lunch time and expecting your call.  ----- Message -----  From: Lucita Vance MD  Sent: 8/6/2024   8:14 AM EDT  To: Elsie Rios MA    Please notify patient that one of the images on her left breast was not clear.  She needs to go back for more images on the left breast but there will be no charge for these mammograms.   Skin normal color for race, warm, dry and intact. No evidence of rash.